# Patient Record
Sex: MALE | Race: WHITE | Employment: OTHER | URBAN - NONMETROPOLITAN AREA
[De-identification: names, ages, dates, MRNs, and addresses within clinical notes are randomized per-mention and may not be internally consistent; named-entity substitution may affect disease eponyms.]

---

## 2017-08-17 ENCOUNTER — OFFICE VISIT (OUTPATIENT)
Dept: SURGERY | Age: 37
End: 2017-08-17
Payer: MEDICARE

## 2017-08-17 VITALS
DIASTOLIC BLOOD PRESSURE: 74 MMHG | SYSTOLIC BLOOD PRESSURE: 128 MMHG | HEIGHT: 62 IN | WEIGHT: 247.8 LBS | BODY MASS INDEX: 45.6 KG/M2 | TEMPERATURE: 98.5 F

## 2017-08-17 DIAGNOSIS — K42.9 UMBILICAL HERNIA WITHOUT OBSTRUCTION AND WITHOUT GANGRENE: Primary | ICD-10-CM

## 2017-08-17 PROCEDURE — 99202 OFFICE O/P NEW SF 15 MIN: CPT | Performed by: SURGERY

## 2017-08-17 PROCEDURE — G8419 CALC BMI OUT NRM PARAM NOF/U: HCPCS | Performed by: SURGERY

## 2017-08-17 PROCEDURE — 1036F TOBACCO NON-USER: CPT | Performed by: SURGERY

## 2017-08-17 PROCEDURE — G8427 DOCREV CUR MEDS BY ELIG CLIN: HCPCS | Performed by: SURGERY

## 2017-09-07 ASSESSMENT — ENCOUNTER SYMPTOMS
SHORTNESS OF BREATH: 0
COUGH: 0
SORE THROAT: 0
ABDOMINAL PAIN: 0
EYE REDNESS: 0
ABDOMINAL DISTENTION: 0
EYE PAIN: 0
BACK PAIN: 0

## 2017-12-11 ENCOUNTER — HOSPITAL ENCOUNTER (INPATIENT)
Age: 37
LOS: 2 days | Discharge: HOME | DRG: 189 | End: 2017-12-13
Attending: EMERGENCY MEDICINE | Admitting: HOSPITALIST
Payer: MEDICARE

## 2017-12-11 ENCOUNTER — APPOINTMENT (OUTPATIENT)
Dept: GENERAL RADIOLOGY | Age: 37
DRG: 189 | End: 2017-12-11
Payer: MEDICARE

## 2017-12-11 DIAGNOSIS — R09.02 HYPOXIA: ICD-10-CM

## 2017-12-11 DIAGNOSIS — R06.2 WHEEZING: ICD-10-CM

## 2017-12-11 DIAGNOSIS — J20.9 ACUTE BRONCHITIS, UNSPECIFIED ORGANISM: Primary | ICD-10-CM

## 2017-12-11 PROBLEM — J96.01 ACUTE RESPIRATORY FAILURE WITH HYPOXIA (HCC): Status: ACTIVE | Noted: 2017-12-11

## 2017-12-11 LAB
ALBUMIN SERPL-MCNC: 3.9 G/DL (ref 3.5–5.2)
ALP BLD-CCNC: 81 U/L (ref 40–130)
ALT SERPL-CCNC: 15 U/L (ref 5–41)
ANION GAP SERPL CALCULATED.3IONS-SCNC: 12 MMOL/L (ref 7–19)
AST SERPL-CCNC: 17 U/L (ref 5–40)
BASE EXCESS ARTERIAL: 5 MMOL/L (ref -2–2)
BASOPHILS ABSOLUTE: 0.1 K/UL (ref 0–0.2)
BASOPHILS RELATIVE PERCENT: 0.9 % (ref 0–1)
BILIRUB SERPL-MCNC: 0.8 MG/DL (ref 0.2–1.2)
BUN BLDV-MCNC: 13 MG/DL (ref 6–20)
CALCIUM SERPL-MCNC: 8.7 MG/DL (ref 8.6–10)
CARBOXYHEMOGLOBIN ARTERIAL: 2.3 % (ref 0–5)
CHLORIDE BLD-SCNC: 100 MMOL/L (ref 98–111)
CO2: 30 MMOL/L (ref 22–29)
CREAT SERPL-MCNC: 0.9 MG/DL (ref 0.5–1.2)
EOSINOPHILS ABSOLUTE: 0.2 K/UL (ref 0–0.6)
EOSINOPHILS RELATIVE PERCENT: 2.5 % (ref 0–5)
GFR NON-AFRICAN AMERICAN: >60
GLUCOSE BLD-MCNC: 95 MG/DL (ref 74–109)
HCO3 ARTERIAL: 30.5 MMOL/L (ref 22–26)
HCT VFR BLD CALC: 43.3 % (ref 42–52)
HEMOGLOBIN, ART, EXTENDED: 14.1 G/DL (ref 14–18)
HEMOGLOBIN: 14.1 G/DL (ref 14–18)
LYMPHOCYTES ABSOLUTE: 1 K/UL (ref 1.1–4.5)
LYMPHOCYTES RELATIVE PERCENT: 12.8 % (ref 20–40)
MCH RBC QN AUTO: 31.8 PG (ref 27–31)
MCHC RBC AUTO-ENTMCNC: 32.6 G/DL (ref 33–37)
MCV RBC AUTO: 97.5 FL (ref 80–94)
METHEMOGLOBIN ARTERIAL: 1.2 %
MONOCYTES ABSOLUTE: 0.6 K/UL (ref 0–0.9)
MONOCYTES RELATIVE PERCENT: 7 % (ref 0–10)
NEUTROPHILS ABSOLUTE: 6.2 K/UL (ref 1.5–7.5)
NEUTROPHILS RELATIVE PERCENT: 75.9 % (ref 50–65)
O2 CONTENT ARTERIAL: 19.3 ML/DL
O2 SAT, ARTERIAL: 96 %
O2 THERAPY: ABNORMAL
PCO2 ARTERIAL: 47 MMHG (ref 35–45)
PDW BLD-RTO: 14.5 % (ref 11.5–14.5)
PH ARTERIAL: 7.42 (ref 7.35–7.45)
PLATELET # BLD: 249 K/UL (ref 130–400)
PMV BLD AUTO: 9.1 FL (ref 9.4–12.4)
PO2 ARTERIAL: 140 MMHG (ref 80–100)
POTASSIUM SERPL-SCNC: 4.5 MMOL/L (ref 3.5–5)
POTASSIUM, WHOLE BLOOD: 3.8
PRO-BNP: 249 PG/ML (ref 0–450)
RAPID INFLUENZA  B AGN: NEGATIVE
RAPID INFLUENZA A AGN: NEGATIVE
RBC # BLD: 4.44 M/UL (ref 4.7–6.1)
SODIUM BLD-SCNC: 142 MMOL/L (ref 136–145)
TOTAL PROTEIN: 6.9 G/DL (ref 6.6–8.7)
WBC # BLD: 8.1 K/UL (ref 4.8–10.8)

## 2017-12-11 PROCEDURE — 82803 BLOOD GASES ANY COMBINATION: CPT

## 2017-12-11 PROCEDURE — 36415 COLL VENOUS BLD VENIPUNCTURE: CPT

## 2017-12-11 PROCEDURE — 99222 1ST HOSP IP/OBS MODERATE 55: CPT | Performed by: HOSPITALIST

## 2017-12-11 PROCEDURE — 2580000003 HC RX 258: Performed by: PHYSICIAN ASSISTANT

## 2017-12-11 PROCEDURE — 2500000003 HC RX 250 WO HCPCS: Performed by: EMERGENCY MEDICINE

## 2017-12-11 PROCEDURE — 80053 COMPREHEN METABOLIC PANEL: CPT

## 2017-12-11 PROCEDURE — 2580000003 HC RX 258: Performed by: EMERGENCY MEDICINE

## 2017-12-11 PROCEDURE — 93005 ELECTROCARDIOGRAM TRACING: CPT

## 2017-12-11 PROCEDURE — 99284 EMERGENCY DEPT VISIT MOD MDM: CPT | Performed by: EMERGENCY MEDICINE

## 2017-12-11 PROCEDURE — 85025 COMPLETE CBC W/AUTO DIFF WBC: CPT

## 2017-12-11 PROCEDURE — 2700000000 HC OXYGEN THERAPY PER DAY

## 2017-12-11 PROCEDURE — 6360000002 HC RX W HCPCS: Performed by: EMERGENCY MEDICINE

## 2017-12-11 PROCEDURE — 71010 XR CHEST PORTABLE: CPT

## 2017-12-11 PROCEDURE — 6360000002 HC RX W HCPCS: Performed by: HOSPITALIST

## 2017-12-11 PROCEDURE — 94667 MNPJ CHEST WALL 1ST: CPT

## 2017-12-11 PROCEDURE — 84132 ASSAY OF SERUM POTASSIUM: CPT

## 2017-12-11 PROCEDURE — 96374 THER/PROPH/DIAG INJ IV PUSH: CPT

## 2017-12-11 PROCEDURE — 94644 CONT INHLJ TX 1ST HOUR: CPT

## 2017-12-11 PROCEDURE — 94640 AIRWAY INHALATION TREATMENT: CPT

## 2017-12-11 PROCEDURE — 87804 INFLUENZA ASSAY W/OPTIC: CPT

## 2017-12-11 PROCEDURE — 99285 EMERGENCY DEPT VISIT HI MDM: CPT

## 2017-12-11 PROCEDURE — 6370000000 HC RX 637 (ALT 250 FOR IP): Performed by: HOSPITALIST

## 2017-12-11 PROCEDURE — 83880 ASSAY OF NATRIURETIC PEPTIDE: CPT

## 2017-12-11 PROCEDURE — 1210000000 HC MED SURG R&B

## 2017-12-11 PROCEDURE — 36600 WITHDRAWAL OF ARTERIAL BLOOD: CPT

## 2017-12-11 PROCEDURE — 94645 CONT INHLJ TX EACH ADDL HOUR: CPT

## 2017-12-11 RX ORDER — SODIUM CHLORIDE 0.9 % (FLUSH) 0.9 %
10 SYRINGE (ML) INJECTION PRN
Status: DISCONTINUED | OUTPATIENT
Start: 2017-12-11 | End: 2017-12-13 | Stop reason: HOSPADM

## 2017-12-11 RX ORDER — DEXTROMETHORPHAN HYDROBROMIDE AND PROMETHAZINE HYDROCHLORIDE 15; 6.25 MG/5ML; MG/5ML
5 SYRUP ORAL 4 TIMES DAILY PRN
COMMUNITY
End: 2019-01-29

## 2017-12-11 RX ORDER — ACETAMINOPHEN 325 MG/1
650 TABLET ORAL EVERY 4 HOURS PRN
Status: DISCONTINUED | OUTPATIENT
Start: 2017-12-11 | End: 2017-12-13 | Stop reason: HOSPADM

## 2017-12-11 RX ORDER — CETIRIZINE HYDROCHLORIDE 10 MG/1
10 TABLET ORAL DAILY
Status: DISCONTINUED | OUTPATIENT
Start: 2017-12-11 | End: 2017-12-13 | Stop reason: HOSPADM

## 2017-12-11 RX ORDER — ALLOPURINOL 300 MG/1
300 TABLET ORAL DAILY
Status: DISCONTINUED | OUTPATIENT
Start: 2017-12-12 | End: 2017-12-13 | Stop reason: HOSPADM

## 2017-12-11 RX ORDER — IPRATROPIUM BROMIDE AND ALBUTEROL SULFATE 2.5; .5 MG/3ML; MG/3ML
1 SOLUTION RESPIRATORY (INHALATION)
Status: DISCONTINUED | OUTPATIENT
Start: 2017-12-11 | End: 2017-12-13 | Stop reason: HOSPADM

## 2017-12-11 RX ORDER — METHYLPREDNISOLONE SODIUM SUCCINATE 40 MG/ML
40 INJECTION, POWDER, LYOPHILIZED, FOR SOLUTION INTRAMUSCULAR; INTRAVENOUS EVERY 6 HOURS
Status: DISCONTINUED | OUTPATIENT
Start: 2017-12-11 | End: 2017-12-12

## 2017-12-11 RX ORDER — UBIDECARENONE 75 MG
100 CAPSULE ORAL DAILY
Status: DISCONTINUED | OUTPATIENT
Start: 2017-12-12 | End: 2017-12-13 | Stop reason: HOSPADM

## 2017-12-11 RX ORDER — PROMETHAZINE HYDROCHLORIDE 6.25 MG/5ML
6.25 SYRUP ORAL 4 TIMES DAILY PRN
Status: DISCONTINUED | OUTPATIENT
Start: 2017-12-11 | End: 2017-12-13 | Stop reason: HOSPADM

## 2017-12-11 RX ORDER — DEXTROMETHORPHAN POLISTIREX 30 MG/5ML
15 SUSPENSION ORAL 4 TIMES DAILY PRN
Status: DISCONTINUED | OUTPATIENT
Start: 2017-12-11 | End: 2017-12-13 | Stop reason: HOSPADM

## 2017-12-11 RX ORDER — FLUTICASONE PROPIONATE 50 MCG
1 SPRAY, SUSPENSION (ML) NASAL DAILY
Status: DISCONTINUED | OUTPATIENT
Start: 2017-12-11 | End: 2017-12-13 | Stop reason: HOSPADM

## 2017-12-11 RX ORDER — DEXTROMETHORPHAN HYDROBROMIDE AND PROMETHAZINE HYDROCHLORIDE 15; 6.25 MG/5ML; MG/5ML
5 SYRUP ORAL 4 TIMES DAILY PRN
Status: DISCONTINUED | OUTPATIENT
Start: 2017-12-11 | End: 2017-12-11

## 2017-12-11 RX ORDER — PREDNISONE 10 MG/1
10 TABLET ORAL DAILY
Status: ON HOLD | COMMUNITY
End: 2017-12-13 | Stop reason: HOSPADM

## 2017-12-11 RX ORDER — METHYLPREDNISOLONE SODIUM SUCCINATE 125 MG/2ML
125 INJECTION, POWDER, LYOPHILIZED, FOR SOLUTION INTRAMUSCULAR; INTRAVENOUS ONCE
Status: COMPLETED | OUTPATIENT
Start: 2017-12-11 | End: 2017-12-11

## 2017-12-11 RX ORDER — BUDESONIDE 0.5 MG/2ML
0.5 INHALANT ORAL 2 TIMES DAILY
Status: DISCONTINUED | OUTPATIENT
Start: 2017-12-11 | End: 2017-12-13 | Stop reason: HOSPADM

## 2017-12-11 RX ORDER — SODIUM CHLORIDE 0.9 % (FLUSH) 0.9 %
10 SYRINGE (ML) INJECTION EVERY 12 HOURS SCHEDULED
Status: DISCONTINUED | OUTPATIENT
Start: 2017-12-11 | End: 2017-12-13 | Stop reason: HOSPADM

## 2017-12-11 RX ORDER — AZITHROMYCIN 1 G
1 PACKET (EA) ORAL ONCE
Status: ON HOLD | COMMUNITY
End: 2017-12-13 | Stop reason: HOSPADM

## 2017-12-11 RX ORDER — GUAIFENESIN 600 MG/1
600 TABLET, EXTENDED RELEASE ORAL 2 TIMES DAILY
Status: DISCONTINUED | OUTPATIENT
Start: 2017-12-11 | End: 2017-12-13 | Stop reason: HOSPADM

## 2017-12-11 RX ORDER — ONDANSETRON 2 MG/ML
4 INJECTION INTRAMUSCULAR; INTRAVENOUS EVERY 6 HOURS PRN
Status: DISCONTINUED | OUTPATIENT
Start: 2017-12-11 | End: 2017-12-13 | Stop reason: HOSPADM

## 2017-12-11 RX ADMIN — CETIRIZINE HYDROCHLORIDE 10 MG: 10 TABLET, FILM COATED ORAL at 21:19

## 2017-12-11 RX ADMIN — GUAIFENESIN 600 MG: 600 TABLET, EXTENDED RELEASE ORAL at 21:19

## 2017-12-11 RX ADMIN — ALBUTEROL SULFATE 2.5 MG: 2.5 SOLUTION RESPIRATORY (INHALATION) at 12:55

## 2017-12-11 RX ADMIN — ALBUTEROL SULFATE 2.5 MG: 2.5 SOLUTION RESPIRATORY (INHALATION) at 14:04

## 2017-12-11 RX ADMIN — IPRATROPIUM BROMIDE 0.5 MG: 0.5 SOLUTION RESPIRATORY (INHALATION) at 12:55

## 2017-12-11 RX ADMIN — IPRATROPIUM BROMIDE AND ALBUTEROL SULFATE 1 AMPULE: .5; 3 SOLUTION RESPIRATORY (INHALATION) at 19:25

## 2017-12-11 RX ADMIN — ENOXAPARIN SODIUM 40 MG: 40 INJECTION SUBCUTANEOUS at 18:42

## 2017-12-11 RX ADMIN — BUDESONIDE 500 MCG: 0.5 INHALANT RESPIRATORY (INHALATION) at 19:25

## 2017-12-11 RX ADMIN — Medication 10 ML: at 21:21

## 2017-12-11 RX ADMIN — FLUTICASONE PROPIONATE 1 SPRAY: 50 SPRAY, METERED NASAL at 21:17

## 2017-12-11 RX ADMIN — METHYLPREDNISOLONE SODIUM SUCCINATE 125 MG: 125 INJECTION, POWDER, FOR SOLUTION INTRAMUSCULAR; INTRAVENOUS at 13:42

## 2017-12-11 RX ADMIN — DOXYCYCLINE 100 MG: 100 INJECTION, POWDER, LYOPHILIZED, FOR SOLUTION INTRAVENOUS at 14:25

## 2017-12-11 RX ADMIN — METHYLPREDNISOLONE SODIUM SUCCINATE 40 MG: 40 INJECTION, POWDER, FOR SOLUTION INTRAMUSCULAR; INTRAVENOUS at 21:19

## 2017-12-11 ASSESSMENT — ENCOUNTER SYMPTOMS
SORE THROAT: 0
VOMITING: 0
SHORTNESS OF BREATH: 1
ABDOMINAL PAIN: 0
NAUSEA: 0
RHINORRHEA: 1
COUGH: 1
DIARRHEA: 0

## 2017-12-11 NOTE — PROGRESS NOTES
pH, Arterial 7.420        pCO2, Arterial 47.0 (H) mmHg       pO2, Arterial 140.0 (H) mmHg       HCO3, Arterial 30.5 (H) mmol/L       Base Excess, Arterial 5.0 (H) mmol/L       Hemoglobin, Art, Extended 14.1 g/dL       O2 Sat, Arterial 96.0 %       Carboxyhgb, Arterial 2.3 %       Methemoglobin, Arterial 1.2 %       O2 Content, Arterial 19.3 mL/dL       O2 Therapy Unknown     Potassium, Whole Blood [[id number]] Collected: 12/11/17 1241      Updated: 12/11/17 1241      Potassium, Whole Blood 3.8     Patient on 100% NRB. ABG's drawn from LR, Kris's test +.

## 2017-12-11 NOTE — ED PROVIDER NOTES
Procedure Laterality Date    CARDIAC SURGERY      open heart surgery at age 6         CURRENT MEDICATIONS       Previous Medications    ALLOPURINOL PO    Take 300 mg by mouth daily     ATORVASTATIN CALCIUM PO    Take 1 tablet by mouth daily     CYANOCOBALAMIN (VITAMIN B-12 PO)    Take 1 tablet by mouth daily        ALLERGIES     Pcn [penicillins]    FAMILY HISTORY       Family History   Problem Relation Age of Onset    Colon Cancer Maternal Aunt     Colon Cancer Maternal Uncle           SOCIAL HISTORY       Social History     Social History    Marital status: Single     Spouse name: N/A    Number of children: N/A    Years of education: N/A     Social History Main Topics    Smoking status: Never Smoker    Smokeless tobacco: Never Used    Alcohol use No    Drug use: No    Sexual activity: Not Asked     Other Topics Concern    None     Social History Narrative    None       SCREENINGS             PHYSICAL EXAM    (up to 7 for level 4, 8 or more for level 5)     ED Triage Vitals [12/11/17 1231]   BP Temp Temp Source Pulse Resp SpO2 Height Weight   133/89 99 °F (37.2 °C) Temporal -- 22 (!) 66 % 5' (1.524 m) 247 lb (112 kg)       Physical Exam   Constitutional: He is oriented to person, place, and time. He appears well-developed and well-nourished. HENT:   Head: Normocephalic and atraumatic. Right Ear: External ear normal.   Left Ear: External ear normal.   Mouth/Throat: Oropharynx is clear and moist. No oropharyngeal exudate. Eyes: Conjunctivae and EOM are normal.   Neck: Normal range of motion. No tracheal deviation present. Cardiovascular: Normal rate, regular rhythm and normal heart sounds. Pulmonary/Chest: Effort normal. He has wheezes. He has no rales. Abdominal: Soft. There is no tenderness. Musculoskeletal: Normal range of motion. He exhibits no edema. Neurological: He is alert and oriented to person, place, and time. Skin: Skin is warm and dry. He is not diaphoretic. DIAGNOSTIC RESULTS     EKG: All EKG's are interpreted by the Emergency Department Physician who either signs or Co-signs this chart in the absence of a cardiologist.    74, normal sinus rhythm, nondiagnostic EKG    RADIOLOGY:   Non-plain film images such as CT, Ultrasound and MRI are read by the radiologist. Plain radiographic images are visualized and preliminarily interpreted by the emergency physician with the below findings:          XR Chest Portable   Final Result    Impression:   Findings concerning for mild/early fluid overload. Signed by Dr Jonathan Bocanegra on 12/11/2017 12:54 PM              LABS:  Labs Reviewed   CBC WITH AUTO DIFFERENTIAL - Abnormal; Notable for the following:        Result Value    RBC 4.44 (*)     MCV 97.5 (*)     MCH 31.8 (*)     MCHC 32.6 (*)     MPV 9.1 (*)     Neutrophils % 75.9 (*)     Lymphocytes % 12.8 (*)     Lymphocytes # 1.0 (*)     All other components within normal limits   COMPREHENSIVE METABOLIC PANEL - Abnormal; Notable for the following:     CO2 30 (*)     All other components within normal limits   BLOOD GAS, ARTERIAL - Abnormal; Notable for the following:     pCO2, Arterial 47.0 (*)     pO2, Arterial 140.0 (*)     HCO3, Arterial 30.5 (*)     Base Excess, Arterial 5.0 (*)     All other components within normal limits   POTASSIUM, WHOLE BLOOD   BRAIN NATRIURETIC PEPTIDE       All other labs were within normal range or not returned as of this dictation.     EMERGENCY DEPARTMENT COURSE and DIFFERENTIAL DIAGNOSIS/MDM:   Vitals:    Vitals:    12/11/17 1231 12/11/17 1232 12/11/17 1430   BP: 133/89  114/80   Pulse:   87   Resp: 22  22   Temp: 99 °F (37.2 °C)  99 °F (37.2 °C)   TempSrc: Temporal     SpO2: (!) 66% 95% 97%   Weight: 247 lb (112 kg)     Height: 5' (1.524 m)         MDM  Number of Diagnoses or Management Options  Acute bronchitis, unspecified organism:   Hypoxia:   Wheezing:      Amount and/or Complexity of Data Reviewed  Clinical lab tests: ordered and reviewed  Tests in the radiology section of CPT®: ordered and reviewed  Discuss the patient with other providers: yes  Independent visualization of images, tracings, or specimens: yes        Patient awake alert mentating conversive hypoxic on arrival, diffuse wheezing on exam, we'll proceed with hour-long nebulizer steroid, continue closely monitor 1248    Questionable mild pulmonary edema on x-ray per radiology read, did evaluate with bedside ultrasound no obvious B lines suggesting pulm edema, pending BNP, still some wheezing but improved generally feel this is more bronchospasm currently we'll repeat nebulizer while awaiting rest labs 1342    BNP only 249, repeating nebulizer, prior to nebulizer patient was tolerating 50% O2 on Ventimask and doing well, overall seems consistent with bronchitis and bronchospasm 1401    Discussed case with Cortney Baldwin hospitalist service who is agreeable with admission 1124    CONSULTS:  None    PROCEDURES:  Unless otherwise noted below, none     Procedures    FINAL IMPRESSION      1. Acute bronchitis, unspecified organism    2. Wheezing    3.  Hypoxia          DISPOSITION/PLAN   DISPOSITION Admitted    PATIENT REFERRED TO:  Dustin Lofton  [facility]  [phone]            DISCHARGE MEDICATIONS:  New Prescriptions    No medications on file          (Please note that portions of this note were completed with a voice recognition program.  Efforts were made to edit the dictations but occasionally words are mis-transcribed.)    Becca Newman MD (electronically signed)  Attending Emergency Physician       Niru Jean Baptiste MD  12/11/17 8885

## 2017-12-11 NOTE — H&P
[facility] - History & Physical      PCP: Ed Garland    Date of Admission: 12/11/2017    Date of Service: 12/11/2017    Chief Complaint:  Shortness of breath    History Of Present Illness: The patient is a 40 y.o. male who presented to [facility] ED complaining of shortness of breath. His symptoms began 3 weeks ago. He saw his PCP and was placed on a z pack and prednisone. He continued to have a non productive cough. His mom has been using Vicks vapor rub for him and a vaporizer in his room. No fever or chills. He does complain of some abdominal pain with cough. He was found to be hypoxic at his PCP's office. Here, he was found to be hypoxic at 66% on room air. Arvell Neelam He had diffuse wheezes and received an hour long nebulizer treatment, with improvement in his symptoms. ED MD felt patient was consistent with bronchospasm and bronchitis. He is admitted to the hospital for further evaluation and treatment. Past Medical History:        Diagnosis Date    Down's syndrome     Gout     Hip fracture ([address]     age 6    Hyperlipidemia        Past Surgical History:        Procedure Laterality Date    CARDIAC SURGERY      open heart surgery at age 6       Home Medications:  Prior to Admission medications    Medication Sig Start Date End Date Taking?  Authorizing Provider   azithromycin (ZITHROMAX) 1 g powder Take 1 packet by mouth once   Yes Historical Provider, MD   promethazine-dextromethorphan (PROMETHAZINE-DM) 6.25-15 MG/5ML syrup Take 5 mLs by mouth 4 times daily as needed for Cough   Yes Historical Provider, MD   predniSONE (DELTASONE) 10 MG tablet Take 10 mg by mouth daily   Yes Historical Provider, MD   ALLOPURINOL PO Take 300 mg by mouth daily    Yes Historical Provider, MD   ATORVASTATIN CALCIUM PO Take 10 mg by mouth daily    Yes Historical Provider, MD   Cyanocobalamin (VITAMIN B-12 PO) Take 250 mg by mouth daily    Yes Historical Provider, MD       Allergies:    Pcn [penicillins]    Social History:    The patient currently lives with his parents. Tobacco:   reports that he has never smoked. He has never used smokeless tobacco.  Alcohol:   reports that he does not drink alcohol. Illicit Drugs: denies    Family History:      Problem Relation Age of Onset    Colon Cancer Maternal Aunt     Colon Cancer Maternal Uncle     High Blood Pressure Mother     High Blood Pressure Father        Review of Systems:   Constitutional / general:  No fever / chills / sweats  HEENT: No sore throat / hoarseness / vision changes  CV:  No chest pain / palpitations/ orthopnea   Respiratory:  +Shortness of breath, cough. Nosputum / hemoptysis  GI: + Abdominal pain with cough. No nausea / vomiting / diarrhea / constipation  :  No dysuria / hesitancy / urgency / hematuria   Neuro: No muscle weakness / dysphagia / headache / paresthesias  Musculoskeletal:  No edema / cyanosis / pain  Psychiatric:  No depression / anxiety / insomnia  Skin:  No new rashes / lesions  Remainder of a 14 point review of systems is negative except as mentioned above. Physical Examination:  /80   Pulse 87   Temp 99 °F (37.2 °C)   Resp 22   Ht 5' (1.524 m)   Wt 247 lb (112 kg)   SpO2 97%   BMI 48.24 kg/m²   General appearance: No apparent distress, appears stated age and cooperative. HEENT: Normal cephalic, atraumatic without obvious deformity. Pupils equal, round, and reactive to light. Extra ocular muscles intact. Conjunctivae/corneas clear. Neck: Supple, with full range of motion. No jugular venous distention. Trachea midline. Respiratory:  Normal respiratory effort. Diminished breath sounds throughout. No wheezes, rales or rhonchi noted. Cardiovascular: Regular rate and rhythm with normal S1/S2 without murmurs, rubs or gallops. Abdomen: Soft, non-tender, non-distended with normal bowel sounds. Musculoskeletal: No clubbing, cyanosis or edema bilaterally.   Full range of motion without

## 2017-12-12 ENCOUNTER — APPOINTMENT (OUTPATIENT)
Dept: GENERAL RADIOLOGY | Age: 37
DRG: 189 | End: 2017-12-12
Payer: MEDICARE

## 2017-12-12 PROBLEM — E87.70 FLUID OVERLOAD: Status: ACTIVE | Noted: 2017-12-12

## 2017-12-12 LAB
ANION GAP SERPL CALCULATED.3IONS-SCNC: 16 MMOL/L (ref 7–19)
BUN BLDV-MCNC: 19 MG/DL (ref 6–20)
CALCIUM SERPL-MCNC: 8.5 MG/DL (ref 8.6–10)
CHLORIDE BLD-SCNC: 101 MMOL/L (ref 98–111)
CO2: 24 MMOL/L (ref 22–29)
CREAT SERPL-MCNC: 0.9 MG/DL (ref 0.5–1.2)
GFR NON-AFRICAN AMERICAN: >60
GLUCOSE BLD-MCNC: 164 MG/DL (ref 74–109)
HCT VFR BLD CALC: 39.2 % (ref 42–52)
HEMOGLOBIN: 12.7 G/DL (ref 14–18)
LV EF: 58 %
LVEF MODALITY: NORMAL
MCH RBC QN AUTO: 31.4 PG (ref 27–31)
MCHC RBC AUTO-ENTMCNC: 32.4 G/DL (ref 33–37)
MCV RBC AUTO: 97 FL (ref 80–94)
PDW BLD-RTO: 14.5 % (ref 11.5–14.5)
PLATELET # BLD: 292 K/UL (ref 130–400)
PMV BLD AUTO: 9.7 FL (ref 9.4–12.4)
POTASSIUM SERPL-SCNC: 4.3 MMOL/L (ref 3.5–5)
RBC # BLD: 4.04 M/UL (ref 4.7–6.1)
SODIUM BLD-SCNC: 141 MMOL/L (ref 136–145)
WBC # BLD: 10.7 K/UL (ref 4.8–10.8)

## 2017-12-12 PROCEDURE — 85027 COMPLETE CBC AUTOMATED: CPT

## 2017-12-12 PROCEDURE — 2500000003 HC RX 250 WO HCPCS: Performed by: EMERGENCY MEDICINE

## 2017-12-12 PROCEDURE — 6360000002 HC RX W HCPCS: Performed by: HOSPITALIST

## 2017-12-12 PROCEDURE — 2700000000 HC OXYGEN THERAPY PER DAY

## 2017-12-12 PROCEDURE — 2580000003 HC RX 258: Performed by: PHYSICIAN ASSISTANT

## 2017-12-12 PROCEDURE — 6370000000 HC RX 637 (ALT 250 FOR IP): Performed by: PHYSICIAN ASSISTANT

## 2017-12-12 PROCEDURE — 6370000000 HC RX 637 (ALT 250 FOR IP): Performed by: HOSPITALIST

## 2017-12-12 PROCEDURE — C8929 TTE W OR WO FOL WCON,DOPPLER: HCPCS

## 2017-12-12 PROCEDURE — 94640 AIRWAY INHALATION TREATMENT: CPT

## 2017-12-12 PROCEDURE — 94668 MNPJ CHEST WALL SBSQ: CPT

## 2017-12-12 PROCEDURE — 2580000003 HC RX 258: Performed by: INTERNAL MEDICINE

## 2017-12-12 PROCEDURE — 36415 COLL VENOUS BLD VENIPUNCTURE: CPT

## 2017-12-12 PROCEDURE — 1210000000 HC MED SURG R&B

## 2017-12-12 PROCEDURE — 80048 BASIC METABOLIC PNL TOTAL CA: CPT

## 2017-12-12 PROCEDURE — 71020 XR CHEST STANDARD TWO VW: CPT

## 2017-12-12 PROCEDURE — 99232 SBSQ HOSP IP/OBS MODERATE 35: CPT | Performed by: PHYSICIAN ASSISTANT

## 2017-12-12 PROCEDURE — 6360000004 HC RX CONTRAST MEDICATION: Performed by: INTERNAL MEDICINE

## 2017-12-12 PROCEDURE — 2580000003 HC RX 258: Performed by: EMERGENCY MEDICINE

## 2017-12-12 RX ORDER — SODIUM CHLORIDE 0.9 % (FLUSH) 0.9 %
10 SYRINGE (ML) INJECTION PRN
Status: ACTIVE | OUTPATIENT
Start: 2017-12-12 | End: 2017-12-13

## 2017-12-12 RX ORDER — BUMETANIDE 1 MG/1
1 TABLET ORAL ONCE
Status: COMPLETED | OUTPATIENT
Start: 2017-12-12 | End: 2017-12-12

## 2017-12-12 RX ORDER — METHYLPREDNISOLONE SODIUM SUCCINATE 40 MG/ML
40 INJECTION, POWDER, LYOPHILIZED, FOR SOLUTION INTRAMUSCULAR; INTRAVENOUS DAILY
Status: DISCONTINUED | OUTPATIENT
Start: 2017-12-13 | End: 2017-12-13 | Stop reason: HOSPADM

## 2017-12-12 RX ADMIN — Medication 10 ML: at 20:42

## 2017-12-12 RX ADMIN — FLUTICASONE PROPIONATE 1 SPRAY: 50 SPRAY, METERED NASAL at 10:36

## 2017-12-12 RX ADMIN — BUMETANIDE 1 MG: 1 TABLET ORAL at 13:25

## 2017-12-12 RX ADMIN — PERFLUTREN 2.2 MG: 6.52 INJECTION, SUSPENSION INTRAVENOUS at 15:00

## 2017-12-12 RX ADMIN — IPRATROPIUM BROMIDE AND ALBUTEROL SULFATE 1 AMPULE: .5; 3 SOLUTION RESPIRATORY (INHALATION) at 06:56

## 2017-12-12 RX ADMIN — BUDESONIDE 500 MCG: 0.5 INHALANT RESPIRATORY (INHALATION) at 19:27

## 2017-12-12 RX ADMIN — DOXYCYCLINE 100 MG: 100 INJECTION, POWDER, LYOPHILIZED, FOR SOLUTION INTRAVENOUS at 15:58

## 2017-12-12 RX ADMIN — METHYLPREDNISOLONE SODIUM SUCCINATE 40 MG: 40 INJECTION, POWDER, FOR SOLUTION INTRAMUSCULAR; INTRAVENOUS at 10:37

## 2017-12-12 RX ADMIN — CETIRIZINE HYDROCHLORIDE 10 MG: 10 TABLET, FILM COATED ORAL at 10:37

## 2017-12-12 RX ADMIN — Medication 10 ML: at 14:58

## 2017-12-12 RX ADMIN — IPRATROPIUM BROMIDE AND ALBUTEROL SULFATE 1 AMPULE: .5; 3 SOLUTION RESPIRATORY (INHALATION) at 19:27

## 2017-12-12 RX ADMIN — VITAMIN B12 0.1 MG ORAL TABLET 100 MCG: 0.1 TABLET ORAL at 10:37

## 2017-12-12 RX ADMIN — IPRATROPIUM BROMIDE AND ALBUTEROL SULFATE 1 AMPULE: .5; 3 SOLUTION RESPIRATORY (INHALATION) at 10:32

## 2017-12-12 RX ADMIN — METHYLPREDNISOLONE SODIUM SUCCINATE 40 MG: 40 INJECTION, POWDER, FOR SOLUTION INTRAMUSCULAR; INTRAVENOUS at 03:12

## 2017-12-12 RX ADMIN — GUAIFENESIN 600 MG: 600 TABLET, EXTENDED RELEASE ORAL at 10:37

## 2017-12-12 RX ADMIN — BUDESONIDE 500 MCG: 0.5 INHALANT RESPIRATORY (INHALATION) at 06:57

## 2017-12-12 RX ADMIN — DOXYCYCLINE 100 MG: 100 INJECTION, POWDER, LYOPHILIZED, FOR SOLUTION INTRAVENOUS at 03:12

## 2017-12-12 RX ADMIN — ENOXAPARIN SODIUM 40 MG: 40 INJECTION SUBCUTANEOUS at 18:12

## 2017-12-12 RX ADMIN — ALLOPURINOL 300 MG: 300 TABLET ORAL at 10:37

## 2017-12-12 RX ADMIN — GUAIFENESIN 600 MG: 600 TABLET, EXTENDED RELEASE ORAL at 20:42

## 2017-12-12 NOTE — PROGRESS NOTES
12 hr chart check complete. Pt in bed no s/s of distress. Call light in reach. Will continue to monitor.  Electronically signed by Elise Merrill RN on 12/12/2017 at 1:38 AM.

## 2017-12-12 NOTE — PROGRESS NOTES
 allopurinol  300 mg Oral Daily    vitamin B-12  100 mcg Oral Daily    guaiFENesin  600 mg Oral BID    fluticasone  1 spray Each Nare Daily    cetirizine  10 mg Oral Daily    budesonide  0.5 mg Nebulization BID     sodium chloride flush, acetaminophen, magnesium hydroxide, ondansetron, promethazine **AND** dextromethorphan, sodium chloride  DIET GENERAL;     Lab and other Data:     Recent Labs      12/11/17   1321  12/12/17   0404   WBC  8.1  10.7   HGB  14.1  12.7*   PLT  249  292     Recent Labs      12/11/17   1241  12/11/17   1404  12/12/17   0404   NA   --   142  141   K  3.8  4.5  4.3   CL   --   100  101   CO2   --   30*  24   BUN   --   13  19   CREATININE   --   0.9  0.9   GLUCOSE   --   95  164*     Recent Labs      12/11/17   1404   AST  17   ALT  15   BILITOT  0.8   ALKPHOS  81       ABGs:   Lab Results   Component Value Date    PHART 7.420 12/11/2017    PO2ART 140.0 12/11/2017    EVS2TLW 47.0 12/11/2017     UA:No results for input(s): NITRITE, COLORU, PHUR, LABCAST, WBCUA, RBCUA, MUCUS, TRICHOMONAS, YEAST, BACTERIA, CLARITYU, SPECGRAV, LEUKOCYTESUR, UROBILINOGEN, BILIRUBINUR, BLOODU, GLUCOSEU, AMORPHOUS in the last 72 hours. Invalid input(s): Aaliyah Dotson    RAD:   Chest Xray -   Impression:   Findings of fluid overload. Correlation with presentation is   recommended to exclude superimposed infection. Signed by Dr Slade Montalvo on 12/12/2017 10:06 AM       Echo: Pending  Micro: Influenza A/B negative. Problem List:     Patient Active Problem List    Diagnosis Date Noted    Fluid overload 12/12/2017    Acute respiratory failure with hypoxia ([address] 12/11/2017    Acute bronchitis 12/11/2017       Assessment and Plan:     Principal Problem:    Acute respiratory failure with hypoxia ([address] - Improving. Continue to wean oxygen as able. Continue nebulizer, pulse dose steroids, antibiotics.     Active Problems:    Acute bronchitis - Continue antibiotics, nebulizer treatments, pulse dose steroids, supplemental oxygen. Fluid overload - 2D Echocardiogram today. Bumex x 1. AM Labs. Discussed with charge nurse, wean oxygen as able.       Antibiotic:  Doxycycline   DVT Prophylaxis:  Kade Peña PA-C  12/12/2017

## 2017-12-13 VITALS
BODY MASS INDEX: 48.49 KG/M2 | WEIGHT: 247 LBS | OXYGEN SATURATION: 93 % | HEIGHT: 60 IN | RESPIRATION RATE: 20 BRPM | HEART RATE: 77 BPM | SYSTOLIC BLOOD PRESSURE: 126 MMHG | TEMPERATURE: 96.6 F | DIASTOLIC BLOOD PRESSURE: 79 MMHG

## 2017-12-13 PROCEDURE — 99239 HOSP IP/OBS DSCHRG MGMT >30: CPT | Performed by: INTERNAL MEDICINE

## 2017-12-13 PROCEDURE — 2580000003 HC RX 258: Performed by: EMERGENCY MEDICINE

## 2017-12-13 PROCEDURE — 97161 PT EVAL LOW COMPLEX 20 MIN: CPT

## 2017-12-13 PROCEDURE — 2580000003 HC RX 258: Performed by: PHYSICIAN ASSISTANT

## 2017-12-13 PROCEDURE — 6370000000 HC RX 637 (ALT 250 FOR IP): Performed by: HOSPITALIST

## 2017-12-13 PROCEDURE — 6370000000 HC RX 637 (ALT 250 FOR IP): Performed by: PHYSICIAN ASSISTANT

## 2017-12-13 PROCEDURE — 2700000000 HC OXYGEN THERAPY PER DAY

## 2017-12-13 PROCEDURE — 6360000002 HC RX W HCPCS: Performed by: PHYSICIAN ASSISTANT

## 2017-12-13 PROCEDURE — G8980 MOBILITY D/C STATUS: HCPCS

## 2017-12-13 PROCEDURE — G8978 MOBILITY CURRENT STATUS: HCPCS

## 2017-12-13 PROCEDURE — 94640 AIRWAY INHALATION TREATMENT: CPT

## 2017-12-13 PROCEDURE — G8979 MOBILITY GOAL STATUS: HCPCS

## 2017-12-13 PROCEDURE — 2500000003 HC RX 250 WO HCPCS: Performed by: EMERGENCY MEDICINE

## 2017-12-13 PROCEDURE — 6360000002 HC RX W HCPCS: Performed by: HOSPITALIST

## 2017-12-13 RX ORDER — ACETAMINOPHEN 325 MG/1
650 TABLET ORAL EVERY 4 HOURS PRN
Qty: 120 TABLET | Refills: 3 | COMMUNITY
Start: 2017-12-13

## 2017-12-13 RX ORDER — ALBUTEROL SULFATE 90 UG/1
2 AEROSOL, METERED RESPIRATORY (INHALATION) EVERY 6 HOURS PRN
Qty: 1 INHALER | Refills: 1 | Status: SHIPPED | OUTPATIENT
Start: 2017-12-13 | End: 2019-01-29

## 2017-12-13 RX ORDER — PREDNISONE 10 MG/1
TABLET ORAL
Qty: 20 TABLET | Refills: 0 | Status: SHIPPED | OUTPATIENT
Start: 2017-12-13 | End: 2019-01-29

## 2017-12-13 RX ORDER — DOXYCYCLINE HYCLATE 100 MG
100 TABLET ORAL 2 TIMES DAILY
Qty: 10 TABLET | Refills: 0 | Status: SHIPPED | OUTPATIENT
Start: 2017-12-13 | End: 2017-12-18

## 2017-12-13 RX ADMIN — ALLOPURINOL 300 MG: 300 TABLET ORAL at 09:41

## 2017-12-13 RX ADMIN — CETIRIZINE HYDROCHLORIDE 10 MG: 10 TABLET, FILM COATED ORAL at 09:41

## 2017-12-13 RX ADMIN — VITAMIN B12 0.1 MG ORAL TABLET 100 MCG: 0.1 TABLET ORAL at 09:40

## 2017-12-13 RX ADMIN — DOXYCYCLINE 100 MG: 100 INJECTION, POWDER, LYOPHILIZED, FOR SOLUTION INTRAVENOUS at 02:32

## 2017-12-13 RX ADMIN — METHYLPREDNISOLONE SODIUM SUCCINATE 40 MG: 40 INJECTION, POWDER, FOR SOLUTION INTRAMUSCULAR; INTRAVENOUS at 09:40

## 2017-12-13 RX ADMIN — FLUTICASONE PROPIONATE 1 SPRAY: 50 SPRAY, METERED NASAL at 09:40

## 2017-12-13 RX ADMIN — IPRATROPIUM BROMIDE AND ALBUTEROL SULFATE 1 AMPULE: .5; 3 SOLUTION RESPIRATORY (INHALATION) at 14:59

## 2017-12-13 RX ADMIN — GUAIFENESIN 600 MG: 600 TABLET, EXTENDED RELEASE ORAL at 09:41

## 2017-12-13 RX ADMIN — IPRATROPIUM BROMIDE AND ALBUTEROL SULFATE 1 AMPULE: .5; 3 SOLUTION RESPIRATORY (INHALATION) at 11:38

## 2017-12-13 RX ADMIN — BUDESONIDE 500 MCG: 0.5 INHALANT RESPIRATORY (INHALATION) at 07:26

## 2017-12-13 RX ADMIN — IPRATROPIUM BROMIDE AND ALBUTEROL SULFATE 1 AMPULE: .5; 3 SOLUTION RESPIRATORY (INHALATION) at 07:26

## 2017-12-13 RX ADMIN — Medication 10 ML: at 09:41

## 2017-12-13 NOTE — DISCHARGE SUMMARY
Hospitalist  Discharge Summary    Patient ID: Girish Valerio      Patient's PCP: Francine Meehan    Admit Date: 12/11/2017     Discharge Date:   12/13/2017    Admitting Physician: Dottie Mondragon MD     Discharge Physician: Tor Kramer DO     Discharge Diagnoses:  Principal Problem:    Acute respiratory failure with hypoxia [facility])  Active Problems:    Acute bronchitis    Fluid overload       Active Hospital Problems    Diagnosis Date Noted    Fluid overload [E87.70] 12/12/2017    Acute respiratory failure with hypoxia (HCC) [J96.01] 12/11/2017    Acute bronchitis [J20.9] 12/11/2017       The patient was seen and examined on day of discharge and this discharge summary is in conjunction with any daily progress note from day of discharge. Hospital Course: Mr Buffy Christensen is a 41 y/o male w/ down syndrome who presented to [facility]11 due to sob and was found to be in acute hypoxic RF after outpatient treatment for likely bronchitis without improvement. Here pt was stared on iv steroids and antibiotics with improvement in symptoms over 48 hours. Today pt w/ spo2 94-95% ambulating on room air. Pt feels improved and wants to go home. Mother at bedside and feels he is ready as well. Will change abx to po doxy bid to finish treatment. Will give pred taper po. Will provide inhaler prn for wheezing (none on exam today). Exam:   Vitals: /79   Pulse 77 Comment: 77 prior to PT and then 141 during amb.   Temp 96.6 °F (35.9 °C) (Temporal)   Resp 20   Ht 5' (1.524 m)   Wt 247 lb (112 kg)   SpO2 93%   BMI 48.24 kg/m²   24HR INTAKE/OUTPUT:  No intake or output data in the 24 hours ending 12/13/17 1919  General appearance: NAD, alert and cooperative with exam  HEENT: atraumatic, PERRLA, EOMI, anicteric, trachea midline  Lungs: no increased work of breathing, CTAB, no wheezing/rhonchi/rales  Heart: RRR, +s1/s2, no rub  Abdomen: soft, nt/nd, +BS, no rebound/gaurding  Extremities: atraumatic, no edema, no cyanosis   Neurologic: AAOx3, no focal deficits  Skin: no rashes  Psych: Normal affect        Consults:   None    Disposition:  home    Discharge Instructions/Follow-up:  PCP    Code Status:  Full Code     Activity: activity as tolerated    Diet: regular diet    Labs:  For convenience and continuity at follow-up the following most recent labs are provided:  CBC:    Lab Results   Component Value Date    WBC 10.7 12/12/2017    HGB 12.7 12/12/2017    HCT 39.2 12/12/2017     12/12/2017       Renal:    Lab Results   Component Value Date     12/12/2017    K 4.3 12/12/2017     12/12/2017    CO2 24 12/12/2017    BUN 19 12/12/2017    CREATININE 0.9 12/12/2017    CALCIUM 8.5 12/12/2017       Discharge Medications:   Current Discharge Medication List           Details   acetaminophen (TYLENOL) 325 MG tablet Take 2 tablets by mouth every 4 hours as needed for Pain or Fever  Qty: 120 tablet, Refills: 3      doxycycline hyclate (VIBRA-TABS) 100 MG tablet Take 1 tablet by mouth 2 times daily for 5 days  Qty: 10 tablet, Refills: 0      albuterol sulfate HFA (PROAIR HFA) 108 (90 Base) MCG/ACT inhaler Inhale 2 puffs into the lungs every 6 hours as needed for Wheezing  Qty: 1 Inhaler, Refills: 1              Details   predniSONE (DELTASONE) 10 MG tablet Take 4 tabs po daily x 2 days, then take 3 tabs po daily x 2 days, then take 2 tabs po daily x 2 days, then take 1 tab po daily x 2 days  Qty: 20 tablet, Refills: 0              Details   promethazine-dextromethorphan (PROMETHAZINE-DM) 6.25-15 MG/5ML syrup Take 5 mLs by mouth 4 times daily as needed for Cough      ALLOPURINOL PO Take 300 mg by mouth daily       ATORVASTATIN CALCIUM PO Take 10 mg by mouth daily       Cyanocobalamin (VITAMIN B-12 PO) Take 250 mg by mouth daily            Current Facility-Administered Medications   Medication Dose Route Frequency Provider Last Rate Last Dose    methylPREDNISolone sodium (SOLU-MEDROL) injection 40 mg  40 mg Intravenous Daily Duke Lucio PA-C   40 mg at 12/13/17 0940    doxycycline (VIBRAMYCIN) 100 mg in dextrose 5 % 100 mL IVPB  100 mg Intravenous Q12H Sarika Mancini MD   Stopped at 12/13/17 0339    ipratropium-albuterol (DUONEB) nebulizer solution 1 ampule  1 ampule Inhalation Q4H [place] Coleen Barone MD   1 ampule at 12/13/17 1459    enoxaparin (LOVENOX) injection 40 mg  40 mg Subcutaneous Daily Coleen Barone MD   40 mg at 12/12/17 1812    sodium chloride flush 0.9 % injection 10 mL  10 mL Intravenous 2 times per day Duke Lucio PA-C   10 mL at 12/13/17 0941    sodium chloride flush 0.9 % injection 10 mL  10 mL Intravenous PRN Duke Lucio PA-C        acetaminophen (TYLENOL) tablet 650 mg  650 mg Oral Q4H PRN Duke Lucio PA-C        magnesium hydroxide (MILK OF MAGNESIA) 400 MG/5ML suspension 30 mL  30 mL Oral Daily PRN Duke Lucio PA-C        ondansetron [facility]) injection 4 mg  4 mg Intravenous Q6H PRN Duke Lucio PA-C        promethazine (PHENERGAN) 6.25 MG/5ML syrup 6.25 mg  6.25 mg Oral 4x Daily PRN Coleen Barone MD        And    dextromethorphan (DELSYM) 30 MG/5ML extended release liquid 15 mg  15 mg Oral 4x Daily PRN Coleen Barone MD        allopurinol (ZYLOPRIM) tablet 300 mg  300 mg Oral Daily Duke Lucio PA-C   300 mg at 12/13/17 3291    vitamin B-12 (CYANOCOBALAMIN) tablet 100 mcg  100 mcg Oral Daily Duke Lucio PA-C   100 mcg at 12/13/17 0940    guaiFENesin [facility]) extended release tablet 600 mg  600 mg Oral BID Coleen Barone MD   600 mg at 12/13/17 0941    fluticasone (FLONASE) 50 MCG/ACT nasal spray 1 spray  1 spray Each Nare Daily Coleen Barone MD   1 spray at 12/13/17 0940    cetirizine (ZYRTEC) tablet 10 mg  10 mg Oral Daily Coleen Barone MD   10 mg at 12/13/17 0941    sodium chloride (OCEAN, BABY AYR) 0.65 % nasal spray 1 spray  1 spray Nasal PRN Coleen Barone MD        budesonide (PULMICORT) nebulizer suspension 500 mcg  0.5 mg Nebulization BID Kathe Morris MD   500 mcg at 12/13/17 6933       Time Spent on discharge is more than 40 minutes in the examination, evaluation, counseling and review of medications and discharge plan. Cleveland Baker D.O. Internal Medicine Hospitalist    Thank you Vanessa Titsu for the opportunity to be involved in this patient's care.  If you have any questions or concerns please feel free to contact me at [phone]

## 2017-12-13 NOTE — PROGRESS NOTES
limited or restricted  Mobility: Walking and Moving Around Goal Status (): At least 1 percent but less than 20 percent impaired, limited or restricted  Mobility: Walking and Moving Around Discharge Status ():  At least 1 percent but less than 20 percent impaired, limited or restricted  OutComes Score                                           AM-PAC Score             Goals  Short term goals  Time Frame for Short term goals: eval only  Patient Goals   Patient goals : go home soon       Therapy Time   Individual Concurrent Group Co-treatment   Time In           Time Out           Minutes                   Ginger Muro PT    Electronically signed by Ginger Muro PT on 12/13/2017 at 1:56 PM

## 2017-12-14 LAB
EKG P AXIS: 28 DEGREES
EKG P-R INTERVAL: 186 MS
EKG Q-T INTERVAL: 410 MS
EKG QRS DURATION: 82 MS
EKG QTC CALCULATION (BAZETT): 432 MS
EKG T AXIS: 60 DEGREES

## 2019-01-21 ENCOUNTER — TELEPHONE (OUTPATIENT)
Dept: CARDIOLOGY | Age: 39
End: 2019-01-21

## 2019-01-29 ENCOUNTER — OFFICE VISIT (OUTPATIENT)
Dept: CARDIOLOGY | Age: 39
End: 2019-01-29
Payer: MEDICARE

## 2019-01-29 VITALS
SYSTOLIC BLOOD PRESSURE: 118 MMHG | DIASTOLIC BLOOD PRESSURE: 68 MMHG | BODY MASS INDEX: 44.72 KG/M2 | HEART RATE: 69 BPM | HEIGHT: 62 IN | WEIGHT: 243 LBS

## 2019-01-29 DIAGNOSIS — E66.01 CLASS 3 SEVERE OBESITY DUE TO EXCESS CALORIES WITHOUT SERIOUS COMORBIDITY WITH BODY MASS INDEX (BMI) OF 40.0 TO 44.9 IN ADULT (HCC): ICD-10-CM

## 2019-01-29 DIAGNOSIS — Q90.9 DOWN'S SYNDROME: ICD-10-CM

## 2019-01-29 DIAGNOSIS — Q24.9 CONGENITAL HEART DEFECT: Primary | ICD-10-CM

## 2019-01-29 PROCEDURE — 99203 OFFICE O/P NEW LOW 30 MIN: CPT | Performed by: CLINICAL NURSE SPECIALIST

## 2019-01-29 PROCEDURE — G8427 DOCREV CUR MEDS BY ELIG CLIN: HCPCS | Performed by: CLINICAL NURSE SPECIALIST

## 2019-01-29 PROCEDURE — 93000 ELECTROCARDIOGRAM COMPLETE: CPT | Performed by: CLINICAL NURSE SPECIALIST

## 2019-01-29 PROCEDURE — G8484 FLU IMMUNIZE NO ADMIN: HCPCS | Performed by: CLINICAL NURSE SPECIALIST

## 2019-01-29 PROCEDURE — G8417 CALC BMI ABV UP PARAM F/U: HCPCS | Performed by: CLINICAL NURSE SPECIALIST

## 2019-01-29 PROCEDURE — 1036F TOBACCO NON-USER: CPT | Performed by: CLINICAL NURSE SPECIALIST

## 2019-01-29 RX ORDER — FUROSEMIDE 20 MG/1
20 TABLET ORAL DAILY
COMMUNITY

## 2020-05-12 ENCOUNTER — OFFICE VISIT (OUTPATIENT)
Dept: CARDIOLOGY | Age: 40
End: 2020-05-12
Payer: MEDICARE

## 2020-05-12 VITALS
HEIGHT: 62 IN | HEART RATE: 74 BPM | WEIGHT: 240 LBS | DIASTOLIC BLOOD PRESSURE: 80 MMHG | SYSTOLIC BLOOD PRESSURE: 120 MMHG | BODY MASS INDEX: 44.16 KG/M2

## 2020-05-12 PROCEDURE — 99213 OFFICE O/P EST LOW 20 MIN: CPT | Performed by: CLINICAL NURSE SPECIALIST

## 2020-05-12 PROCEDURE — 93000 ELECTROCARDIOGRAM COMPLETE: CPT | Performed by: CLINICAL NURSE SPECIALIST

## 2020-05-12 NOTE — PATIENT INSTRUCTIONS
Follow up in 1 year   Call with any questions or concerns  Follow up with Duane Mercer for non cardiac problems  Report any new problems  Cardiovascular Fitness-Exercise as tolerated. Strive for 30 minutes of exercise most days of the week. Cardiac / Healthy Diet- low salt   Continue current medications as directed  Continue plan of treatment  It is always recommended that you bring your medications bottles with you to each visit - this is for your safety!

## 2021-05-14 ENCOUNTER — OFFICE VISIT (OUTPATIENT)
Dept: CARDIOLOGY CLINIC | Age: 41
End: 2021-05-14
Payer: MEDICARE

## 2021-05-14 VITALS
OXYGEN SATURATION: 94 % | HEART RATE: 62 BPM | DIASTOLIC BLOOD PRESSURE: 76 MMHG | WEIGHT: 240 LBS | SYSTOLIC BLOOD PRESSURE: 124 MMHG | HEIGHT: 62 IN | BODY MASS INDEX: 44.16 KG/M2

## 2021-05-14 DIAGNOSIS — Q90.9 DOWN'S SYNDROME: ICD-10-CM

## 2021-05-14 DIAGNOSIS — Q24.9 CONGENITAL HEART DISEASE: Primary | ICD-10-CM

## 2021-05-14 PROCEDURE — 99213 OFFICE O/P EST LOW 20 MIN: CPT | Performed by: CLINICAL NURSE SPECIALIST

## 2021-05-14 NOTE — PROGRESS NOTES
Gout     Hip fracture [facility])     age 6    Hyperlipidemia      Past Surgical History:   Procedure Laterality Date    CARDIAC SURGERY  07/25/1990    Repair of endocardial cushion defect, ASD and cleft mitral valve      Family History   Problem Relation Age of Onset    Colon Cancer Maternal Aunt     Colon Cancer Maternal Uncle     High Blood Pressure Mother     High Blood Pressure Father      Social History     Tobacco Use    Smoking status: Never Smoker    Smokeless tobacco: Never Used   Substance Use Topics    Alcohol use: No      Current Outpatient Medications   Medication Sig Dispense Refill    furosemide (LASIX) 20 MG tablet Take 20 mg by mouth 2 times daily      acetaminophen (TYLENOL) 325 MG tablet Take 2 tablets by mouth every 4 hours as needed for Pain or Fever 120 tablet 3    ALLOPURINOL PO Take 300 mg by mouth daily       Cyanocobalamin (VITAMIN B-12 PO) Take 250 mg by mouth daily        No current facility-administered medications for this visit. Allergies: Pcn [penicillins]    Review of Systems  Constitutional  no significant activity change, appetite change, or unexpected weight change. No fever, chills or diaphoresis. No fatigue. HEENT  no significant rhinorrhea or epistaxis. No tinnitus or significant hearing loss. Eyes  no sudden vision change or amaurosis. Respiratory  no significant wheezing, stridor, apnea or cough. No dyspnea on exertion or shortness of breath. Cardiovascular  no exertional chest pain, orthopnea or PND. No sensation of arrhythmia or slow heart rate. No claudication or leg edema. Gastrointestinal  no abdominal swelling or pain. No blood in stool. No severe constipation, diarrhea, nausea, or vomiting. Genitourinary  no difficulty urinating, dysuria, frequency, or urgency. No flank pain or hematuria. Musculoskeletal  no back pain, gait disturbance, or myalgia. Skin  no color change or rash. No pallor. No new surgical incision. Neurologic  no speech difficulty, facial asymmetry or lateralizing weakness. No seizures, presyncope, syncope, or significant dizziness. Hematologic  no easy bruising or excessive bleeding. Psychiatric  no severe anxiety or insomnia. No confusion. All other review of systems are negative. Objective  Vital Signs - /76   Pulse 62   Ht 5' 2\" (1.575 m)   Wt 240 lb (108.9 kg)   SpO2 94%   BMI 43.90 kg/m²   General - Debarasaurabh Hendricks is alert, cooperative, and pleasant. Well groomed. No acute distress. Body habitus is obese. HEENT  The head is normocephalic. No circumoral cyanosis. Dentition is normal.   EYES -  No Xanthelasma, no arcus senilis, no conjunctival hemorrhages or discharge. Neck - Supple, without increased jugular venous pressures. No carotid bruits. No mass. Respiratory - Lungs are clear bilaterally. No wheezes or rales. Normal effort without use of accessory muscles. Cardiovascular  Heart has regular rhythm and rate. No murmurs, rubs or gallops. + pedal pulses and no varicosities. Abdominal -  Soft, nontender, nondistended. Bowel sounds are intact. Extremities - No clubbing, cyanosis, or  edema. Musculoskeletal -  No clubbing . No Osler's nodes. Gait normal .  No kyphosis or scoliosis. Skin -  no statis ulcers or dermatitis. Neurological - No focal signs are identified. Oriented to person, place and time. Psychiatric -  Appropriate affect and mood. Assessment:     Diagnosis Orders   1. Congenital heart disease     2. Down's syndrome       Data:  BP Readings from Last 3 Encounters:   05/14/21 124/76   05/12/20 120/80   01/29/19 118/68    Pulse Readings from Last 3 Encounters:   05/14/21 62   05/12/20 74   01/29/19 69        Wt Readings from Last 3 Encounters:   05/14/21 240 lb (108.9 kg)   05/12/20 240 lb (108.9 kg)   01/29/19 243 lb (110.2 kg)     Has lost 11 lbs on home scales  Blood pressure heart rate and weight stable.   Medical management includes no antihypertensives with low-dose diuretic use as needed appropriately    Last echo was in 2017. There is been no change in activity tolerance. Encouraged him to continue regular exercise. He has been vaccinated and hopes to get back to his activities with [employer]    We discussed signs and symptoms to report for changes. Will repeat 2D echo probably next year with his follow-up       States taking medications as prescribed  Stable cardiovascular status. No evidence of overt heart failure, angina or dysrhythmia. 20 minutes were spent preparing, reviewing and seeing patient. All questions answered    Plan    Follow up in 1 year With ECHO   Call with any questions or concerns  Follow up with Conchis Levy for non cardiac problems  Report any new problems  Cardiovascular Fitness-Exercise as tolerated. Strive for 30 minutes of exercise most days of the week. Cardiac / Healthy Diet  Continue current medications as directed  Continue plan of treatment  It is always recommended that you bring your medications bottles with you to each visit - this is for your safety! NACHO Jones dragon/transcription disclaimer: Much of this encounter note is electronic transcription/translation of spoken language to printed tach. Electronic translation of spoken language may be erroneous, or at times, nonsensical words or phrases may be inadvertently transcribed.  Although, I have reviewed the note for such errors, some may still exist.

## 2021-05-14 NOTE — PATIENT INSTRUCTIONS
Follow up in 1 year With ECHO   Call with any questions or concerns  Follow up with Nafisa Graves for non cardiac problems  Report any new problems  Cardiovascular Fitness-Exercise as tolerated. Strive for 30 minutes of exercise most days of the week. Cardiac / Healthy Diet  Continue current medications as directed  Continue plan of treatment  It is always recommended that you bring your medications bottles with you to each visit - this is for your safety!

## 2022-05-17 ENCOUNTER — OFFICE VISIT (OUTPATIENT)
Dept: CARDIOLOGY CLINIC | Age: 42
End: 2022-05-17
Payer: MEDICARE

## 2022-05-17 VITALS
HEART RATE: 60 BPM | BODY MASS INDEX: 39.84 KG/M2 | DIASTOLIC BLOOD PRESSURE: 80 MMHG | WEIGHT: 211 LBS | HEIGHT: 61 IN | SYSTOLIC BLOOD PRESSURE: 132 MMHG

## 2022-05-17 DIAGNOSIS — Q24.9 CONGENITAL HEART DISEASE: Primary | ICD-10-CM

## 2022-05-17 DIAGNOSIS — Q90.9 DOWN'S SYNDROME: ICD-10-CM

## 2022-05-17 PROCEDURE — 99213 OFFICE O/P EST LOW 20 MIN: CPT | Performed by: CLINICAL NURSE SPECIALIST

## 2022-05-17 PROCEDURE — 93000 ELECTROCARDIOGRAM COMPLETE: CPT | Performed by: CLINICAL NURSE SPECIALIST

## 2022-05-17 RX ORDER — METRONIDAZOLE 7.5 MG/G
GEL TOPICAL 2 TIMES DAILY
COMMUNITY

## 2022-05-17 NOTE — PATIENT INSTRUCTIONS
Follow up in 1 year   Call with any questions or concerns  Follow up with Yelitza Small for non cardiac problems  Report any new problems  Cardiovascular Fitness-Exercise as tolerated. Strive for 30 minutes of exercise most days of the week. Cardiac / Healthy Diet  Continue current medications as directed  Continue plan of treatment  It is always recommended that you bring your medications bottles with you to each visit - this is for your safety!

## 2022-05-17 NOTE — PROGRESS NOTES
[facility]  [facility] [address]  [address]  Phone: [phone]  Fax: [phone]    OFFICE VISIT:  2022 Blessing - : 1980    Reason For Visit:  Nathaniel Be is a 37 y.o. male who is here for 1 Year Follow Up (no cardiac symptoms) and Hyperlipidemia  History of Down syndrome endocardial cushion defect ASD, cleft mitral valve  89    Underwent 2D echo   that showed  Individual aortic valve leaflets are not clearly visualized.   Peak instantaneous transaortic gradient is estimated to be 28 mmHg with a   mean pressure of 15   The left ventricle was not well visualized.   Left ventricular ejection fraction is visually estimated at 55-60%. He returns today accompanied with his mom. He states overall he is doing well. [place] [facility] [place] [facility] [employer] [place] or in the park in the summer  He is not noticed any new problems or complaints. He states that sometimes he has some swelling in his legs and will take a Lasix. He notices this if he eats too much salty foods. States that he is weighing daily and if he gains a couple pounds overnight he will take the Lasix  He is participating in golf and bowling for [employer]. Amy Gomez denies exertional chest pain, shortness of breath, orthopnea, paroxysmal nocturnal dyspnea, syncope, presyncope, arrhythmia, edema and fatigue. The patient denies numbness or weakness to suggest cerebrovascular accident or transient ischemic attack. John Callahan is PCP and follows labs.   Lucia Funk has the following history as [facility] in [facility]:    Patient Active Problem List    Diagnosis Date Noted    Down's syndrome     Hypoxia     Fluid overload 2017    Acute respiratory failure with hypoxia (HCC) 2017    Acute bronchitis 2017     Past Medical History:   Diagnosis Date    Congenital heart disease     ASD, endocardial cushion defect, cleft mitral valve    Down's syndrome     Gout     Hip fracture [facility])     age 6    Hyperlipidemia      Past Surgical History:   Procedure Laterality Date    CARDIAC SURGERY  07/25/1990    Repair of endocardial cushion defect, ASD and cleft mitral valve      Family History   Problem Relation Age of Onset    Colon Cancer Maternal Aunt     Colon Cancer Maternal Uncle     High Blood Pressure Mother     High Blood Pressure Father      Social History     Tobacco Use    Smoking status: Never Smoker    Smokeless tobacco: Never Used   Substance Use Topics    Alcohol use: No      Current Outpatient Medications   Medication Sig Dispense Refill    metroNIDAZOLE (METROGEL) 0.75 % gel Apply topically 2 times daily Apply topically 2 times daily.  furosemide (LASIX) 20 MG tablet Take 20 mg by mouth daily Indications: patient is to take one tablet with a 2 lb weight gain       acetaminophen (TYLENOL) 325 MG tablet Take 2 tablets by mouth every 4 hours as needed for Pain or Fever 120 tablet 3    ALLOPURINOL PO Take 300 mg by mouth daily       Cyanocobalamin (VITAMIN B-12 PO) Take 250 mg by mouth daily        No current facility-administered medications for this visit. Allergies: Pcn [penicillins]    Review of Systems  Constitutional  no significant activity change, appetite change, or unexpected weight change. No fever, chills or diaphoresis. No fatigue. HEENT  no significant rhinorrhea or epistaxis. No tinnitus or significant hearing loss. Eyes  no sudden vision change or amaurosis. Respiratory  no significant wheezing, stridor, apnea or cough. No dyspnea on exertion or shortness of breath. Cardiovascular  no exertional chest pain, orthopnea or PND. No sensation of arrhythmia or slow heart rate. No claudication or leg edema. Gastrointestinal  no abdominal swelling or pain. No blood in stool. No severe constipation, diarrhea, nausea, or vomiting. Genitourinary  no difficulty urinating, dysuria, frequency, or urgency. No flank pain or hematuria. Musculoskeletal  no back pain, gait disturbance, or myalgia. Skin  no color change or rash. No pallor. No new surgical incision. Neurologic  no speech difficulty, facial asymmetry or lateralizing weakness. No seizures, presyncope, syncope, or significant dizziness. Hematologic  no easy bruising or excessive bleeding. Psychiatric  no severe anxiety or insomnia. No confusion. All other review of systems are negative. Objective  Vital Signs - /80   Pulse 60   Ht 5' 1\" (1.549 m)   Wt 211 lb (95.7 kg)   BMI 39.87 kg/m²   General - Ibrahima Fan is alert, cooperative, and pleasant. Well groomed. No acute distress. Body habitus is obese. HEENT  The head is normocephalic. No circumoral cyanosis. Dentition is normal.   EYES -  No Xanthelasma, no arcus senilis, no conjunctival hemorrhages or discharge. Neck - Supple, without increased jugular venous pressures. No carotid bruits. No mass. Respiratory - Lungs are clear bilaterally. No wheezes or rales. Normal effort without use of accessory muscles. Cardiovascular  Heart has regular rhythm and rate. No murmurs, rubs or gallops. + pedal pulses and no varicosities. Abdominal -  Soft, nontender, nondistended. Bowel sounds are intact. Extremities - No clubbing, cyanosis, or  edema. Musculoskeletal -  No clubbing . No Osler's nodes. Gait normal .  No kyphosis or scoliosis. Skin -  no statis ulcers or dermatitis. Neurological - No focal signs are identified. Oriented to person, place and time. Psychiatric -  Appropriate affect and mood. Assessment:     Diagnosis Orders   1. Congenital heart disease  EKG 12 lead   2.  Down's syndrome       Data:  BP Readings from Last 3 Encounters:   05/17/22 132/80   05/14/21 124/76   05/12/20 120/80    Pulse Readings from Last 3 Encounters:   05/17/22 60   05/14/21 62   05/12/20 74        Wt Readings from Last 3 Encounters:   05/17/22 211 lb (95.7 kg)   05/14/21 240 lb (108.9 kg)   05/12/20 240 lb (108.9 kg)       EKG today shows sinus rhythm with a rate of 60    Blood pressure and heart rate well controlled. Takes no antihypertensives. He is down 29 pounds from last year! He is walking daily and has had no change of activity tolerance. Last 2D echo was in 2017. We discussed signs and symptoms report. If any changes we will repeat his echo otherwise we will see him back in a year       States taking medications as prescribed  Stable cardiovascular status. No evidence of overt heart failure, angina or dysrhythmia. 20 minutes were spent preparing, reviewing and seeing patient. All questions answered    Plan    Follow up in 1 year   Call with any questions or concerns  Follow up with Pete Peoples for non cardiac problems  Report any new problems  Cardiovascular Fitness-Exercise as tolerated. Strive for 30 minutes of exercise most days of the week. Cardiac / Healthy Diet  Continue current medications as directed  Continue plan of treatment  It is always recommended that you bring your medications bottles with you to each visit - this is for your safety! NACHO Vera    EMR dragon/transcription disclaimer: Much of this encounter note is electronic transcription/translation of spoken language to printed tach. Electronic translation of spoken language may be erroneous, or at times, nonsensical words or phrases may be inadvertently transcribed.  Although, I have reviewed the note for such errors, some may still exist.

## 2023-05-18 ENCOUNTER — OFFICE VISIT (OUTPATIENT)
Dept: CARDIOLOGY CLINIC | Age: 43
End: 2023-05-18
Payer: MEDICARE

## 2023-05-18 VITALS
DIASTOLIC BLOOD PRESSURE: 70 MMHG | BODY MASS INDEX: 39.08 KG/M2 | HEIGHT: 61 IN | HEART RATE: 54 BPM | WEIGHT: 207 LBS | SYSTOLIC BLOOD PRESSURE: 124 MMHG

## 2023-05-18 DIAGNOSIS — Q90.9 DOWN'S SYNDROME: ICD-10-CM

## 2023-05-18 DIAGNOSIS — Q24.9 CONGENITAL HEART DISEASE: Primary | ICD-10-CM

## 2023-05-18 PROCEDURE — 93000 ELECTROCARDIOGRAM COMPLETE: CPT | Performed by: CLINICAL NURSE SPECIALIST

## 2023-05-18 PROCEDURE — 99213 OFFICE O/P EST LOW 20 MIN: CPT | Performed by: CLINICAL NURSE SPECIALIST

## 2023-05-18 NOTE — PROGRESS NOTES
[facility] Cardiology  [facility] [address]  [address]  Phone: [phone]  Fax: [phone]    OFFICE VISIT:  2023    Emily Jamison - : 1980    Reason For Visit:  Alyssa Blancas is a 40 y.o. male who is here for 1 Year Follow Up (No symptoms)  History of Down syndrome endocardial cushion defect ASD, cleft mitral valve  89    Underwent 2D echo   that showed  Individual aortic valve leaflets are not clearly visualized. Peak instantaneous transaortic gradient is estimated to be 28 mmHg with a   mean pressure of 15   The left ventricle was not well visualized. Left ventricular ejection fraction is visually estimated at 55-60%. He returns today accompanied with his mom. He denies any cardiac symptoms. He is walking up to 10 miles a week. He is getting ready to start golf again. He did bowling through the winter. Lidia Laboy denies exertional chest pain, shortness of breath, orthopnea, paroxysmal nocturnal dyspnea, syncope, presyncope, arrhythmia, edema and fatigue. The patient denies numbness or weakness to suggest cerebrovascular accident or transient ischemic attack. Kentrell Rush is PCP and follows labs.   Emily Jamison has the following history as recorded in [facility]:    Patient Active Problem List    Diagnosis Date Noted    Down's syndrome     Hypoxia     Fluid overload 2017    Acute respiratory failure with hypoxia ([address] 2017    Acute bronchitis 2017     Past Medical History:   Diagnosis Date    Congenital heart disease     ASD, endocardial cushion defect, cleft mitral valve    Down's syndrome     Gout     Hip fracture ([address]     age 6    Hyperlipidemia      Past Surgical History:   Procedure Laterality Date    CARDIAC SURGERY  1990    Repair of endocardial cushion defect, ASD and cleft mitral valve      Family History   Problem Relation Age of Onset    Colon Cancer Maternal Aunt     Colon Cancer Maternal Uncle     High Blood

## 2023-05-18 NOTE — PATIENT INSTRUCTIONS
Maintain good blood pressure control-goal<130/80 at rest  Maintain good cholesterol control LDL goal<70 with arterial disease  If you are diabetic work to keep/obtain hemoglobin A1c< 7    Follow up in 1 year   Call with any questions or concerns  Follow up with Nia Mckeon for non cardiac problems  Report any new problems  Cardiovascular Fitness-Exercise as tolerated. Strive for 30 minutes of exercise most days of the week. Cardiac / Healthy Diet- Avoid processed high fat foods, maintain low sodium/salt   Continue current medications as directed  Continue plan of treatment  It is always recommended that you bring your medications bottles with you to each visit - this is for your safety!

## 2024-05-09 ENCOUNTER — TELEPHONE (OUTPATIENT)
Dept: CARDIOLOGY CLINIC | Age: 44
End: 2024-05-09

## 2024-08-01 ENCOUNTER — OFFICE VISIT (OUTPATIENT)
Dept: CARDIOLOGY CLINIC | Age: 44
End: 2024-08-01
Payer: MEDICARE

## 2024-08-01 VITALS
SYSTOLIC BLOOD PRESSURE: 124 MMHG | WEIGHT: 201 LBS | DIASTOLIC BLOOD PRESSURE: 84 MMHG | HEIGHT: 61 IN | BODY MASS INDEX: 37.95 KG/M2

## 2024-08-01 DIAGNOSIS — Q90.9 DOWN'S SYNDROME: ICD-10-CM

## 2024-08-01 DIAGNOSIS — R01.1 HEART MURMUR: ICD-10-CM

## 2024-08-01 DIAGNOSIS — Z87.74 H/O CONGENITAL ATRIAL SEPTAL DEFECT (ASD) REPAIR: ICD-10-CM

## 2024-08-01 DIAGNOSIS — Q24.9 CONGENITAL HEART DISEASE: Primary | ICD-10-CM

## 2024-08-01 PROCEDURE — 99214 OFFICE O/P EST MOD 30 MIN: CPT | Performed by: CLINICAL NURSE SPECIALIST

## 2024-08-01 PROCEDURE — 93000 ELECTROCARDIOGRAM COMPLETE: CPT | Performed by: CLINICAL NURSE SPECIALIST

## 2024-08-01 NOTE — PATIENT INSTRUCTIONS
ECHO soon     Maintain good blood pressure control-goal<130/80 at rest  Maintain good cholesterol control LDL goal<70 with arterial disease  If you are diabetic work to keep/obtain hemoglobin A1c< 7    Follow up in 1 year   Call with any questions or concerns  Follow up with Artur Pak MD for non cardiac problems  Report any new problems  Cardiovascular Fitness-Exercise as tolerated.  Strive for 30 minutes of exercise most days of the week.    Cardiac / Healthy Diet- Avoid processed high fat foods, maintain low sodium/salt   Continue current medications as directed  Continue plan of treatment  It is always recommended that you bring your medications bottles with you to each visit - this is for your safety!

## 2024-08-01 NOTE — PROGRESS NOTES
[facility] Cardiology  [address] [address]  Phone: [phone]  Fax: [phone]    OFFICE VISIT:  2024    Andrea Vera - : 1980    Reason For Visit:  Andrea is a 43 y.o. male who is here for Follow-up (No heart symptoms)  History of Down syndrome endocardial cushion defect ASD, cleft mitral valve 89    Underwent 2D echo  that showed  Individual aortic valve leaflets are not clearly visualized.   Peak instantaneous transaortic gradient is estimated to be 28 mmHg with a   mean pressure of 15   The left ventricle was not well visualized.   Left ventricular ejection fraction is visually estimated at 55-60%.     He returns today accompanied with his mom.  He denies any cardiac symptoms.   He is continue to do some walking daily.  He has started swimming this summer and is also participating in golf and bowling    He is watching what he eats and has been losing some weight    Subjective  Andrea denies exertional chest pain, shortness of breath, orthopnea, paroxysmal nocturnal dyspnea, syncope, presyncope, arrhythmia, edema and fatigue.  The patient denies numbness or weakness to suggest cerebrovascular accident or transient ischemic attack.    Artur Pak MD is PCP and follows labs.  Andrea Vera has the following history as recorded in [facility]:    Patient Active Problem List    Diagnosis Date Noted    Down's syndrome     Hypoxia     Fluid overload 2017    Acute respiratory failure with hypoxia (HCC) 2017    Acute bronchitis 2017     Past Medical History:   Diagnosis Date    Congenital heart disease     ASD, endocardial cushion defect, cleft mitral valve    Down's syndrome     Gout     Hip fracture (HCC)     age 10    Hyperlipidemia      Past Surgical History:   Procedure Laterality Date    CARDIAC SURGERY  1990    Repair of endocardial cushion defect, ASD and cleft mitral valve      Family History   Problem Relation Age of Onset

## 2024-08-05 ENCOUNTER — HOSPITAL ENCOUNTER (OUTPATIENT)
Age: 44
Discharge: HOME | End: 2024-08-07
Payer: MEDICARE

## 2024-08-05 VITALS
BODY MASS INDEX: 37.95 KG/M2 | HEIGHT: 61 IN | SYSTOLIC BLOOD PRESSURE: 124 MMHG | DIASTOLIC BLOOD PRESSURE: 84 MMHG | WEIGHT: 201 LBS

## 2024-08-05 DIAGNOSIS — R01.1 HEART MURMUR: ICD-10-CM

## 2024-08-05 DIAGNOSIS — Z87.74 H/O CONGENITAL ATRIAL SEPTAL DEFECT (ASD) REPAIR: ICD-10-CM

## 2024-08-05 DIAGNOSIS — Q24.9 CONGENITAL HEART DISEASE: ICD-10-CM

## 2024-08-05 LAB
ECHO AO ROOT DIAM: 3.4 CM
ECHO AO ROOT INDEX: 1.8 CM/M2
ECHO AO SINUS VALSALVA INDEX: 1.8 CM/M2
ECHO AO ST JNCT DIAM: 3.5 CM
ECHO AV AREA PEAK VELOCITY: 3.2 CM2
ECHO AV AREA VTI: 3.3 CM2
ECHO AV AREA/BSA PEAK VELOCITY: 1.7 CM2/M2
ECHO AV AREA/BSA VTI: 1.7 CM2/M2
ECHO AV MEAN GRADIENT: 11 MMHG
ECHO AV MEAN VELOCITY: 1.5 M/S
ECHO AV PEAK GRADIENT: 21 MMHG
ECHO AV PEAK VELOCITY: 2.3 M/S
ECHO AV VELOCITY RATIO: 0.7
ECHO AV VTI: 47.3 CM
ECHO BSA: 1.98 M2
ECHO LA AREA 2C: 15.3 CM2
ECHO LA AREA 4C: 17 CM2
ECHO LA DIAMETER INDEX: 2.17 CM/M2
ECHO LA DIAMETER: 4.1 CM
ECHO LA MAJOR AXIS: 5.8 CM
ECHO LA MINOR AXIS: 5.5 CM
ECHO LA TO AORTIC ROOT RATIO: 1.21
ECHO LA VOL BP: 39 ML (ref 18–58)
ECHO LA VOL MOD A2C: 37 ML (ref 18–58)
ECHO LA VOL MOD A4C: 40 ML (ref 18–58)
ECHO LA VOL/BSA BIPLANE: 21 ML/M2 (ref 16–34)
ECHO LA VOLUME INDEX MOD A2C: 20 ML/M2 (ref 16–34)
ECHO LA VOLUME INDEX MOD A4C: 21 ML/M2 (ref 16–34)
ECHO LV E' LATERAL VELOCITY: 11 CM/S
ECHO LV E' SEPTAL VELOCITY: 6 CM/S
ECHO LV EDV A2C: 128 ML
ECHO LV EDV A4C: 125 ML
ECHO LV EDV INDEX A4C: 66 ML/M2
ECHO LV EDV NDEX A2C: 68 ML/M2
ECHO LV EJECTION FRACTION A2C: 59 %
ECHO LV EJECTION FRACTION A4C: 61 %
ECHO LV EJECTION FRACTION BIPLANE: 60 % (ref 55–100)
ECHO LV ESV A2C: 52 ML
ECHO LV ESV A4C: 49 ML
ECHO LV ESV INDEX A2C: 28 ML/M2
ECHO LV ESV INDEX A4C: 26 ML/M2
ECHO LV FRACTIONAL SHORTENING: 31 % (ref 28–44)
ECHO LV INTERNAL DIMENSION DIASTOLE INDEX: 2.59 CM/M2
ECHO LV INTERNAL DIMENSION DIASTOLIC: 4.9 CM (ref 4.2–5.9)
ECHO LV INTERNAL DIMENSION SYSTOLIC INDEX: 1.8 CM/M2
ECHO LV INTERNAL DIMENSION SYSTOLIC: 3.4 CM
ECHO LV IVSD: 0.8 CM (ref 0.6–1)
ECHO LV MASS 2D: 153 G (ref 88–224)
ECHO LV MASS INDEX 2D: 80.9 G/M2 (ref 49–115)
ECHO LV POSTERIOR WALL DIASTOLIC: 1 CM (ref 0.6–1)
ECHO LV RELATIVE WALL THICKNESS RATIO: 0.41
ECHO LVOT AREA: 4.5 CM2
ECHO LVOT AV VTI INDEX: 0.73
ECHO LVOT DIAM: 2.4 CM
ECHO LVOT MEAN GRADIENT: 6 MMHG
ECHO LVOT PEAK GRADIENT: 10 MMHG
ECHO LVOT PEAK VELOCITY: 1.6 M/S
ECHO LVOT STROKE VOLUME INDEX: 83 ML/M2
ECHO LVOT SV: 156.9 ML
ECHO LVOT VTI: 34.7 CM
ECHO MV A VELOCITY: 0.64 M/S
ECHO MV AREA VTI: 3.7 CM2
ECHO MV E DECELERATION TIME (DT): 217 MS
ECHO MV E VELOCITY: 1.39 M/S
ECHO MV E/A RATIO: 2.17
ECHO MV E/E' LATERAL: 12.64
ECHO MV E/E' RATIO (AVERAGED): 17.9
ECHO MV E/E' SEPTAL: 23.17
ECHO MV LVOT VTI INDEX: 1.21
ECHO MV MAX VELOCITY: 1.5 M/S
ECHO MV MEAN GRADIENT: 3 MMHG
ECHO MV MEAN VELOCITY: 0.8 M/S
ECHO MV PEAK GRADIENT: 9 MMHG
ECHO MV REGURGITANT PEAK GRADIENT: 100 MMHG
ECHO MV REGURGITANT PEAK VELOCITY: 5 M/S
ECHO MV VTI: 42.1 CM
ECHO PV MAX VELOCITY: 1.7 M/S
ECHO PV MEAN GRADIENT: 6 MMHG
ECHO PV MEAN VELOCITY: 1.1 M/S
ECHO PV PEAK GRADIENT: 11 MMHG
ECHO PV VTI: 33.7 CM
ECHO RV INTERNAL DIMENSION: 3.3 CM
ECHO RV TAPSE: 2.4 CM (ref 1.7–?)
ECHO TV REGURGITANT MAX VELOCITY: 2.2 M/S
ECHO TV REGURGITANT PEAK GRADIENT: 19 MMHG

## 2024-08-05 PROCEDURE — C8929 TTE W OR WO FOL WCON,DOPPLER: HCPCS

## 2024-08-05 PROCEDURE — 93306 TTE W/DOPPLER COMPLETE: CPT | Performed by: INTERNAL MEDICINE

## 2024-08-05 PROCEDURE — 6360000004 HC RX CONTRAST MEDICATION: Performed by: CLINICAL NURSE SPECIALIST

## 2024-08-05 RX ADMIN — PERFLUTREN 1.5 ML: 6.52 INJECTION, SUSPENSION INTRAVENOUS at 14:10

## 2025-08-05 ENCOUNTER — OFFICE VISIT (OUTPATIENT)
Age: 45
End: 2025-08-05
Payer: MEDICARE

## 2025-08-05 VITALS
HEART RATE: 53 BPM | BODY MASS INDEX: 38.66 KG/M2 | WEIGHT: 196.9 LBS | TEMPERATURE: 97.8 F | SYSTOLIC BLOOD PRESSURE: 112 MMHG | DIASTOLIC BLOOD PRESSURE: 64 MMHG | OXYGEN SATURATION: 97 % | RESPIRATION RATE: 18 BRPM | HEIGHT: 60 IN

## 2025-08-05 DIAGNOSIS — J30.2 SEASONAL ALLERGIES: Chronic | ICD-10-CM

## 2025-08-05 DIAGNOSIS — E55.9 VITAMIN D DEFICIENCY: Chronic | ICD-10-CM

## 2025-08-05 DIAGNOSIS — Q90.9 DOWN'S SYNDROME: Primary | Chronic | ICD-10-CM

## 2025-08-05 DIAGNOSIS — M1A.9XX0 CHRONIC GOUT WITHOUT TOPHUS, UNSPECIFIED CAUSE, UNSPECIFIED SITE: Chronic | ICD-10-CM

## 2025-08-05 DIAGNOSIS — E78.2 MIXED HYPERLIPIDEMIA: Chronic | ICD-10-CM

## 2025-08-05 DIAGNOSIS — L42 PITYRIASIS ROSEA: Chronic | ICD-10-CM

## 2025-08-05 RX ORDER — FLUTICASONE PROPIONATE 50 MCG
1 SPRAY, SUSPENSION (ML) NASAL DAILY
COMMUNITY
Start: 2025-05-19 | End: 2025-08-05 | Stop reason: SDUPTHER

## 2025-08-05 RX ORDER — METRONIDAZOLE TOPICAL 7.5 MG/G
GEL TOPICAL 2 TIMES DAILY
COMMUNITY
End: 2025-08-05 | Stop reason: SDUPTHER

## 2025-08-05 RX ORDER — ALLOPURINOL 300 MG/1
300 TABLET ORAL DAILY
Qty: 90 TABLET | Refills: 1 | Status: SHIPPED | OUTPATIENT
Start: 2025-08-05

## 2025-08-05 RX ORDER — TRIAMCINOLONE ACETONIDE 1 MG/G
1 CREAM TOPICAL 2 TIMES DAILY
Qty: 80 G | Refills: 5 | Status: SHIPPED | OUTPATIENT
Start: 2025-08-05

## 2025-08-05 RX ORDER — ALLOPURINOL 300 MG/1
300 TABLET ORAL DAILY
COMMUNITY
Start: 2025-07-23 | End: 2025-08-05 | Stop reason: SDUPTHER

## 2025-08-05 RX ORDER — ERYTHROMYCIN 5 MG/G
OINTMENT OPHTHALMIC ONCE
COMMUNITY
Start: 2025-07-23 | End: 2025-08-05 | Stop reason: SDUPTHER

## 2025-08-05 RX ORDER — ATORVASTATIN CALCIUM 10 MG/1
10 TABLET, FILM COATED ORAL DAILY
Qty: 90 TABLET | Refills: 1 | Status: SHIPPED | OUTPATIENT
Start: 2025-08-05

## 2025-08-05 RX ORDER — ATORVASTATIN CALCIUM 10 MG/1
10 TABLET, FILM COATED ORAL DAILY
COMMUNITY
Start: 2025-05-19 | End: 2025-08-05 | Stop reason: SDUPTHER

## 2025-08-05 RX ORDER — METRONIDAZOLE TOPICAL 7.5 MG/G
GEL TOPICAL 2 TIMES DAILY
Qty: 45 G | Refills: 5 | Status: SHIPPED | OUTPATIENT
Start: 2025-08-05

## 2025-08-05 RX ORDER — ERYTHROMYCIN 5 MG/G
OINTMENT OPHTHALMIC ONCE
Qty: 3.5 G | Refills: 2 | Status: SHIPPED | OUTPATIENT
Start: 2025-08-05 | End: 2025-08-05

## 2025-08-05 RX ORDER — FLUTICASONE PROPIONATE 50 MCG
1 SPRAY, SUSPENSION (ML) NASAL DAILY
Qty: 16 G | Refills: 5 | Status: SHIPPED | OUTPATIENT
Start: 2025-08-05

## 2025-08-05 RX ORDER — TRIAMCINOLONE ACETONIDE 1 MG/G
1 CREAM TOPICAL 2 TIMES DAILY
COMMUNITY
Start: 2025-07-23 | End: 2025-08-05 | Stop reason: SDUPTHER

## 2025-08-06 ENCOUNTER — RESULTS FOLLOW-UP (OUTPATIENT)
Age: 45
End: 2025-08-06
Payer: MEDICARE

## 2025-08-06 LAB
25(OH)D3+25(OH)D2 SERPL-MCNC: 36.9 NG/ML (ref 30–100)
ALBUMIN SERPL-MCNC: 3.9 G/DL (ref 4.1–5.1)
ALP SERPL-CCNC: 74 IU/L (ref 44–121)
ALT SERPL-CCNC: 14 IU/L (ref 0–44)
AST SERPL-CCNC: 19 IU/L (ref 0–40)
BASOPHILS # BLD AUTO: 0.1 X10E3/UL (ref 0–0.2)
BASOPHILS NFR BLD AUTO: 1 %
BILIRUB SERPL-MCNC: 1.4 MG/DL (ref 0–1.2)
BUN SERPL-MCNC: 13 MG/DL (ref 6–24)
BUN/CREAT SERPL: 14 (ref 9–20)
CALCIUM SERPL-MCNC: 8.6 MG/DL (ref 8.7–10.2)
CHLORIDE SERPL-SCNC: 100 MMOL/L (ref 96–106)
CHOLEST SERPL-MCNC: 147 MG/DL (ref 100–199)
CO2 SERPL-SCNC: 26 MMOL/L (ref 20–29)
CREAT SERPL-MCNC: 0.9 MG/DL (ref 0.76–1.27)
EGFRCR SERPLBLD CKD-EPI 2021: 109 ML/MIN/1.73
EOSINOPHIL # BLD AUTO: 0.1 X10E3/UL (ref 0–0.4)
EOSINOPHIL NFR BLD AUTO: 1 %
ERYTHROCYTE [DISTWIDTH] IN BLOOD BY AUTOMATED COUNT: 13.8 % (ref 11.6–15.4)
GLOBULIN SER CALC-MCNC: 2.8 G/DL (ref 1.5–4.5)
GLUCOSE SERPL-MCNC: 82 MG/DL (ref 70–99)
HCT VFR BLD AUTO: 45.6 % (ref 37.5–51)
HDLC SERPL-MCNC: 37 MG/DL
HGB BLD-MCNC: 14.7 G/DL (ref 13–17.7)
IMM GRANULOCYTES # BLD AUTO: 0.1 X10E3/UL (ref 0–0.1)
IMM GRANULOCYTES NFR BLD AUTO: 1 %
LDLC SERPL CALC-MCNC: 92 MG/DL (ref 0–99)
LYMPHOCYTES # BLD AUTO: 1.3 X10E3/UL (ref 0.7–3.1)
LYMPHOCYTES NFR BLD AUTO: 21 %
MCH RBC QN AUTO: 32.3 PG (ref 26.6–33)
MCHC RBC AUTO-ENTMCNC: 32.2 G/DL (ref 31.5–35.7)
MCV RBC AUTO: 100 FL (ref 79–97)
MONOCYTES # BLD AUTO: 0.4 X10E3/UL (ref 0.1–0.9)
MONOCYTES NFR BLD AUTO: 7 %
NEUTROPHILS # BLD AUTO: 4.3 X10E3/UL (ref 1.4–7)
NEUTROPHILS NFR BLD AUTO: 69 %
PLATELET # BLD AUTO: 233 X10E3/UL (ref 150–450)
POTASSIUM SERPL-SCNC: 4.3 MMOL/L (ref 3.5–5.2)
PROT SERPL-MCNC: 6.7 G/DL (ref 6–8.5)
RBC # BLD AUTO: 4.55 X10E6/UL (ref 4.14–5.8)
SODIUM SERPL-SCNC: 141 MMOL/L (ref 134–144)
TRIGL SERPL-MCNC: 96 MG/DL (ref 0–149)
VLDLC SERPL CALC-MCNC: 18 MG/DL (ref 5–40)
WBC # BLD AUTO: 6.2 X10E3/UL (ref 3.4–10.8)